# Patient Record
Sex: FEMALE | Race: WHITE
[De-identification: names, ages, dates, MRNs, and addresses within clinical notes are randomized per-mention and may not be internally consistent; named-entity substitution may affect disease eponyms.]

---

## 2017-06-20 ENCOUNTER — HOSPITAL ENCOUNTER (OUTPATIENT)
Dept: HOSPITAL 61 - PCVCIMAG | Age: 77
Discharge: HOME | End: 2017-06-20
Attending: INTERNAL MEDICINE
Payer: COMMERCIAL

## 2017-06-20 DIAGNOSIS — I65.23: ICD-10-CM

## 2017-06-20 DIAGNOSIS — I25.10: ICD-10-CM

## 2017-06-20 DIAGNOSIS — I10: ICD-10-CM

## 2017-06-20 DIAGNOSIS — E11.9: ICD-10-CM

## 2017-06-20 DIAGNOSIS — I63.9: ICD-10-CM

## 2017-06-20 DIAGNOSIS — I34.0: Primary | ICD-10-CM

## 2017-06-20 PROCEDURE — 93306 TTE W/DOPPLER COMPLETE: CPT

## 2017-06-20 PROCEDURE — 93880 EXTRACRANIAL BILAT STUDY: CPT

## 2017-06-20 NOTE — PCVCIMAG
--------------- APPROVED REPORT --------------





Study performed:  2017 10:44:09



EXAM: Comprehensive 2D, Doppler, and color-flow 

Echocardiogram

Patient Location: Echo lab

Status:  routine



Other Information 

Study Quality: Good



Risk Factors: 

Cardiac Risk Factors:  HTN, Hyperlipidemia, DM



Indications

CVA/TIA 

Diabetes

Dyspnea 

CAD

Hypertension/HDD



2D Dimensions

LVEF(%):  65.23 (>50%)

IVSd:  8.91 (7-11mm)LVOT Diam:  19.10 (18-24mm) 

LVDd:  49.78 mm

PWd:  8.97 (7-11mm)Ascending Ao:  27.20 (22-36mm)

LVDs:  31.91 (25-40mm)

Left Atrium:  36.16 (27-40mm)

Aortic Root:  20.15 mm

LV Single Plane 4CH:  50.11 %

LV Single Plane 2CH:  60.02 %Gonzalez's LVEF:  55.06 %

Biplane EF:  56.3 %



Volumes

Left Atrial Volume (Systole)

Single Plane 4CH:  55.08 mLSingle Plane 2CH:  55.87 mL

LA ESV Index:  30.00 mL/m2



Aortic Valve

AoV Peak Romaine.:  1.77 m/s

AO Peak Gr.:  12.49 mmHgLVOT Max P.06 mmHg

LVOT Max V:  1.01 m/s

CHAVA Vmax: 1.63 cm2



Mitral Valve

E/A Ratio:  0.8

MV Decel. Time:  207.24 ms

MV E Max Romaine.:  1.06 m/s

MV A Romaine.:  1.30 m/s

IVRT:  51.90 ms



TDI

E/Lateral E':  15.14E/Medial E':  21.20

Medial E' Romaine.:  0.05 m/s

Lateral E' Romaine.:  0.07 m/s



Pulmonary Valve

PV Peak Romaine.:  1.14 m/sPV Peak Gr.:  5.17 mmHg



Pulmonary Vein

P Vein S:    0.61 m/sP Vein A:  0.35 m/s

P Vein D:   0.42 m/sP Vein A Dur.:  114.2 msec

P Vein S/D Ratio:  1.45



Tricuspid Valve

TR Peak Romaine.:  1.33 m/s

TR Peak Gr.:  7.05 mmHg

TV Vmax:  0.44 m/s



Left Ventricle

The left ventricle is normal size. There is normal LV segmental wall 

motion. There is normal left ventricular wall thickness. Left 

ventricular systolic function is normal. The left ventricular 

ejection fraction is within the normal range. LVEF is 55-60%. Grade I 

- abnormal relaxation pattern.



Right Ventricle

The right ventricle is normal size. The right ventricular systolic 

function is normal.



Atria

The left atrium size is normal. The right atrium size is 

normal.



Aortic Valve

The aortic valve is normal in structure. No aortic regurgitation is 

present. There is no aortic valvular stenosis.



Mitral Valve

The mitral valve is normal in structure. Mild mitral regurgitation. 

No evidence of mitral valve stenosis.



Tricuspid Valve

The tricuspid valve is normal in structure. There is no tricuspid 

valve stenosis. Trace tricuspid regurgitation with a PA pressure of 

14mmHg.



Pulmonic Valve

The pulmonary valve is normal in structure. There is no pulmonic 

valvular regurgitation.



Great Vessels

The aortic root is normal in size. The ascending aorta is normal in 

size. IVC is normal in size and collapses with >50% inspiration 

The pulmonary artery is normal.



Pericardium

There is no pericardial effusion. There is no pleural 

effusion.



<Conclusion>

The left ventricle is normal size.

Left ventricular systolic function is normal.

The left ventricular ejection fraction is within the normal 

range.

LVEF is 55-60%.

Grade I - abnormal relaxation pattern.

The right ventricular systolic function is normal.

The left atrium size is normal.

The right atrium size is normal.

The aortic valve is normal in structure.

Mild mitral regurgitation.

There is no pericardial effusion.

## 2017-06-20 NOTE — PCVCIMAG
EXAM: BILATERAL CAROTID DUPLEX



INDICATION: Carotid Occlusive Disease.



FINDINGS: 

Doppler Measurements (centimeters per second):



RIGHT:  Peak CCA-91, Peak ECA-157, Diastolic ICA-30, Peak ICA-135,

ICA/CCA Ratio-1.5.



LEFT:  Peak CCA-134, Peak ECA-129, Diastolic ICA-34, Peak ICA-134,

ICA/CCA Ratio-1.0.



RIGHT CAROTID: The carotid bulb has moderate plaque. The proximal

internal carotid artery shows 40-50% stenosis. The common carotid

artery shows no significant stenosis. The external carotid artery

shows 50% stenosis.



LEFT CAROTID:  The carotid bulb has moderate plaque. The proximal

internal carotid artery shows 40-50% stenosis. The common carotid

artery shows less than 50% stenosis. The external carotid artery shows

no significant stenosis.



Antegrade flow in both vertebral arteries.



IMPRESSION: 

40-50% stenosis of the right internal carotid artery with moderate

plaque.

40-50% stenosis of the left internal carotid artery with moderate

plaque.



LOC:[place] [id number]

## 2018-10-29 ENCOUNTER — HOSPITAL ENCOUNTER (OUTPATIENT)
Dept: HOSPITAL 61 - PCVCIMAG | Age: 78
Discharge: HOME | End: 2018-10-29
Attending: INTERNAL MEDICINE
Payer: COMMERCIAL

## 2018-10-29 DIAGNOSIS — I65.23: Primary | ICD-10-CM

## 2018-10-29 PROCEDURE — 93880 EXTRACRANIAL BILAT STUDY: CPT

## 2018-10-29 NOTE — PCVCIMAG
EXAM: BILATERAL CAROTID DUPLEX



INDICATION: Carotid Occlusive Disease. Prior bilateral carotid

endarterectomies.



FINDINGS: 

Doppler Measurements (centimeters per second):



RIGHT:  Peak CCA-81, Peak ECA-137, Diastolic ICA-26, Peak ICA-133,

ICA/CCA Ratio-1.6.



LEFT:  Peak CCA-120, Peak ECA-241, Diastolic ICA-21, Peak ICA-123,

ICA/CCA Ratio-1.0.



RIGHT CAROTID: The carotid bulb has mild plaque. The proximal internal

carotid artery shows <40% stenosis. The common carotid artery shows no

significant stenosis. The external carotid artery shows 50% stenosis.



LEFT CAROTID:  The carotid bulb has mild plaque. The proximal internal

carotid artery shows <40% stenosis. The common carotid artery shows

mild stenosis. The external carotid artery shows 60% stenosis.



Antegrade flow in both vertebral arteries.



IMPRESSION: 

<40% stenosis of the right internal carotid artery with mild plaque.

<40% stenosis of the left internal carotid artery with mild plaque.



LOC:[place] [id number]